# Patient Record
Sex: MALE | Race: WHITE | NOT HISPANIC OR LATINO | URBAN - METROPOLITAN AREA
[De-identification: names, ages, dates, MRNs, and addresses within clinical notes are randomized per-mention and may not be internally consistent; named-entity substitution may affect disease eponyms.]

---
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
## 2019-04-15 ENCOUNTER — OFFICE VISIT - RIVER FALLS (OUTPATIENT)
Dept: FAMILY MEDICINE | Facility: CLINIC | Age: 19
End: 2019-04-15

## 2022-02-12 VITALS
HEART RATE: 84 BPM | SYSTOLIC BLOOD PRESSURE: 128 MMHG | TEMPERATURE: 98.3 F | WEIGHT: 134 LBS | BODY MASS INDEX: 23.74 KG/M2 | DIASTOLIC BLOOD PRESSURE: 84 MMHG

## 2022-02-12 VITALS
WEIGHT: 143 LBS | HEIGHT: 63 IN | OXYGEN SATURATION: 97 % | SYSTOLIC BLOOD PRESSURE: 144 MMHG | BODY MASS INDEX: 25.34 KG/M2 | TEMPERATURE: 97.8 F | HEART RATE: 73 BPM | DIASTOLIC BLOOD PRESSURE: 79 MMHG

## 2022-02-12 VITALS
SYSTOLIC BLOOD PRESSURE: 118 MMHG | WEIGHT: 134.2 LBS | BODY MASS INDEX: 24.06 KG/M2 | WEIGHT: 135.8 LBS | SYSTOLIC BLOOD PRESSURE: 139 MMHG | DIASTOLIC BLOOD PRESSURE: 80 MMHG | HEIGHT: 63 IN | BODY MASS INDEX: 23.77 KG/M2 | HEART RATE: 80 BPM | TEMPERATURE: 99.2 F | TEMPERATURE: 97.8 F | DIASTOLIC BLOOD PRESSURE: 88 MMHG

## 2022-02-12 VITALS
TEMPERATURE: 98.5 F | SYSTOLIC BLOOD PRESSURE: 112 MMHG | BODY MASS INDEX: 24.02 KG/M2 | OXYGEN SATURATION: 97 % | BODY MASS INDEX: 24.03 KG/M2 | HEIGHT: 63 IN | DIASTOLIC BLOOD PRESSURE: 82 MMHG | HEART RATE: 70 BPM | TEMPERATURE: 98.1 F | WEIGHT: 135.6 LBS | WEIGHT: 135.6 LBS

## 2022-02-16 NOTE — PROGRESS NOTES
Patient:   SIMON GEIGER            MRN: [id number]            FIN: [id number]               Age:   16 years     Sex:  Male     :  2000   Associated Diagnoses:   Laceration of left thumb   Author:   Sejal Ty      Chief Complaint       2017 11:34 AM CDT f/u laceration   2017 7:14 PM CDT Reached in a box and cut left thumb on utility knife         History of Present Illness   PPC with grandmother for f/u to left thumb laceration from  repaired with dermabond last night, today has small amt of pink fluid from center and he thought it may be infected      Review of Systems   Constitutional:  Negative.    Integumentary:  Negative except as documented in history of present illness.       Health Status   Allergies:    Allergic Reactions (Selected)  No known allergies   Medications:  (Selected)   Documented Medications  Documented  Advair Diskus 100 mcg-50 mcg inhalation powder: 1 puff(s), inh, bid, 0 Refill(s), Type: Maintenance  Pulmicort Flexhaler 90 mcg/inh inhalation powder: inh, bid, 0 Refill(s), Type: Maintenance   Problem list:    All Problems (Selected)  Absent Kidney, Congenital / ICD-9-.0 / Confirmed  Asthma / SNOMED CT 545658130 / Confirmed  Scoliosis / ICD-9-.30 / Confirmed      Histories   Past Medical History:    Resolved  Asthma ([id number]):  Resolved.   Family History:    No family history items have been selected or recorded.      Physical Examination   Vital Signs   2017 11:34 AM CDT Temperature Temporal 97.8 DegF    Peripheral Pulse Rate 80 bpm    Pulse Site Radial artery    HR Method Manual    Systolic Blood Pressure 118 mmHg    Diastolic Blood Pressure 80 mmHg    Mean Arterial Pressure 93 mmHg    BP Site Right arm    BP Method Manual   2017 7:14 PM CDT Temperature Tympanic 99.2 DegF    Pulse Site Brachial artery    HR Method Electronic    Systolic Blood Pressure 139 mmHg  HI    Diastolic Blood Pressure 88 mmHg  HI    Mean Arterial Pressure 105  mmHg    BP Site Right arm    BP Method Electronic      General:  Alert and oriented, No acute distress.    Eye:  Pupils are equal, round and reactive to light.    HENT:  Normocephalic.    Respiratory:  Respirations are non-labored.    Cardiovascular:  Regular rhythm, No edema.    Musculoskeletal:  Normal range of motion, Normal gait.    Integumentary:  Warm, Dry, Pink, left thumb with superficial laceration, approx 1.5cm-2.0cm long  no induration, no erythema, no excessive warmth  there is a pinpoint opening in center and with pressure a scant amt of serosanguinous drainage is expressed.    Neurologic:  Alert, Oriented, Normal sensory, No focal deficits.    Psychiatric:  Cooperative, Appropriate mood & affect, Normal judgment.       Impression and Plan   Diagnosis     Laceration of left thumb ([id number]-NK S61.012A).     Patient Instructions:       Counseled: Patient, Family, Verbalized understanding.    Summary:  no infection present  bacitracin and bandaide applied, also will use short digit palstic splint to protect finger pad from re-opening, can remove for showering  should be worn throughout the weekend to help with protection  if finger becomes red, there is thick purulent discharge or there is swelling I would like to hear from pt.

## 2022-02-16 NOTE — PROGRESS NOTES
Chief Complaint    Patient here for upper neck pain. Pt. stated that he noticed it about a week ago.  History of Present Illness      Butch is a 17 year old male, with hx of congenital scoliosis and T2-L4 fusion, who presents to the clinic with concerns re:neck pain x 1 week.  he states it feels tight in the posterior neck and upper shoulders.  he has pain with AROM of the neck.  He has never had this problem in the past  he is able to take tylenol with some relieve.  (unable to take NSAIDS due to congenital solitary kidney).  He has had no uri sx, fevers.  he does have HA that feel tension like at times but are mild.  He denies any tingling, numbness, weakness or shooting pains down the arms B.  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          Review of Systems      Review of systems is negative with the exception of those noted in HPI          Physical Exam   Vitals & Measurements    T: 97.8(Tympanic)  HR: 73(Peripheral)  BP: 144/79  SpO2: 97%     HT: 63 in  WT: 143.0 lb  BMI: 25.33       Exam reveals obvious asyemtry to the trunk with shift to the R and R shoulder height higher than L.       loss of normal cervical lordosis wiht forward head and slight kyphosis of the thoracic spine.       There is pain to palpation of the upper traps and nuchal area but no tenderness along the midline C or T spine.       there is decreased AROM in flexion, extension lateral rotation to the R and lateral flexion to the R secondary to both pain as well as asymetry.       muscular strength , sensation and DTR are symetrical and WNL for upper ext B  Assessment/Plan       Neck pain         Discussed with pt conservative measures including PT for maintainence of ROM, Stretching, and pain control.  Pt is agreeable  He may not take nsaids due to solidary kidney, thus I have recommended trial of muscle relaxant and continue  tylenol.  ice, heat may be helpful as well.  if sx not improving or developing radiculopathy may need further evaluation or consideration of fu with his spine surgeon at [facility].   Pt agreeable with plan.         Ordered:          79620 office outpatient visit 15 minutes (Charge), Quantity: 1, Neck pain          Physical Therapy (Request), Priority: Urgent, Instructions: eval and treat , pt prefers home exercise education, Neck pain                Orders:         methocarbamol, 1-2 tabs, PO, tid, PRN: muscle spasm, # 30 tab(s), 0 Refill(s), Type: Maintenance, Pharmacy: [id number], 1-2 tabs po tid,PRN:muscle spasm  Patient Information     Name:SIMON GEIGER      Address:      [address] [place] [id number]     Sex:Male     YOB: 2000     [phone]     MRN:[id number]     FIN:[id number]     Location:[facility]     Date of Service:02/05/2018      Primary Care Physician:       NONE ,   Problem List/Past Medical History    Ongoing     Absent Kidney, Congenital     Asthma     Scoliosis    Historical     Asthma  Procedure/Surgical History     Spinal fusion (07/30/2013)     Tonsillectomy (2003)     Adenoidectomy  Medications     Pulmicort Flexhaler 90 mcg/inh inhalation powder: inh, bid.     Advair Diskus 100 mcg-50 mcg inhalation powder: 1 puff(s), inh, bid.     Robaxin 500 mg oral tablet: 1-2 tabs, PO, tid, PRN: muscle spasm, 30 tab(s), 0 Refill(s).      Allergies    No known allergies  Social History    Smoking Status - 02/05/2018     Never smoker     Tobacco - 07/02/2016      Never smoker  Immunizations      Vaccine Date Status Comments      tetanus/diphth/pertuss (Tdap) adult/adol 07/20/2017 Given      influenza virus vaccine, inactivated 10/30/2015 Given      influenza virus vaccine, inactivated 10/03/2014 Given      influenza virus vaccine, inactivated 11/05/2013 Recorded [1/14/2014] This injection was given at [facility].      influenza virus vaccine, inactivated 09/20/2012 Recorded      meningococcal conjugate vaccine 02/04/2011 Recorded      tetanus/diphth/pertuss (Tdap) adult/adol 02/04/2011 Recorded  Lab Results   Results (Last 90 days)   No results located.

## 2022-02-16 NOTE — PROGRESS NOTES
Patient:   SIMON GEIGER            MRN: [id number]            FIN: [id number]               Age:   16 years     Sex:  Male     :  2000   Associated Diagnoses:   Laceration of left thumb   Author:   Edvin Eagle MD      Chief Complaint  Reached in a box and cut left thumb on utility knife (17 19:14 by Teressa Ray CMA).      Physical Examination   General:  Vital Signs   2017 7:14 PM CDT Temperature Tympanic 99.2 DegF    Pulse Site Brachial artery    HR Method Electronic    Systolic Blood Pressure 139 mmHg  HI    Diastolic Blood Pressure 88 mmHg  HI    Mean Arterial Pressure 105 mmHg    BP Site Right arm    BP Method Electronic      .       Procedure   Laceration repair procedure   Date:  2017.     Location: the left thumb, 1.5 cm, superficial.     Preparation and technique: skin prepped in usual sterile fashion, sterile preparation of site in usual fashion, skin closure completed with tissue adhesive.     Procedure tolerated: well.     No Complications.        Impression and Plan   Diagnosis     Laceration of left thumb ([id number] S61.012A).     Counseled:  discussed wound care, expected course of healing.

## 2022-02-16 NOTE — NURSING NOTE
Comprehensive Intake Entered On:  4/15/2019 2:01 PM CDT    Performed On:  4/15/2019 1:55 PM CDT by KristiYenny medina               Summary   Weight Measured :   134 lb(Converted to: 134 lb 0 oz, 60.78 kg)    Yenny Hanna - 4/15/2019 2:03 PM CDT   Chief Complaint :   Complains of score throat x3 days.   Systolic Blood Pressure :   128 mmHg   Diastolic Blood Pressure :   84 mmHg (HI)    Mean Arterial Pressure :   99 mmHg   Peripheral Pulse Rate :   84 bpm   BP Site :   Right arm   Pulse Site :   Radial artery   BP Method :   Manual   HR Method :   Manual   Temperature Tympanic :   98.3 DegF(Converted to: 36.8 DegC)    Yenny Hanna - 4/15/2019 1:55 PM CDT   Health Status   Allergies Verified? :   Yes   Medication History Verified? :   Yes   Pre-Visit Planning Status :   Not completed   Tobacco Use? :   Never smoker   Yenny Hanna - 4/15/2019 1:55 PM CDT   Demographics   Last Name :   Sudheer   Address :   [facility] [address] [place] [facility] [address]   First Name :   Butch   Panda Initial :   E   Responsible Party Date of Birth () :   2000 CDT   City :   [place]   State :   [place]   Zip Code :   [address]   Yenny Hanna - 4/15/2019 1:55 PM CDT   Consents   Consent for Immunization Exchange :   Consent Granted   Consent for Immunizations to Providers :   Consent Granted   Yenny Hanna - 4/15/2019 1:55 PM CDT   Problems   (As Of: 4/15/2019 2:01:01 PM CDT)   Problems(Active)    Absent Kidney, Congenital (ICD-9-CM  :753.0 )  Name of Problem:   Absent Kidney, Congenital ; Recorder:   Tommy Michaels MD; Confirmation:   Confirmed ; Classification:   Medical ; Code:   753.0 ; Contributor System:   PowerChart ; Last Updated:   2014 7:16 PM CST ; Life Cycle Status:   Active ; Responsible Provider:   Tommy Michaels MD; Vocabulary:   ICD-9-CM        Asthma (SNOMED CT  :663402430 )  Name of Problem:   Asthma ; Recorder:   Tommy Michaels MD; Confirmation:   Confirmed ; Classification:   Medical ; Code:    730214450 ; Contributor System:   [employer] ; Last Updated:   10/13/2015 4:28 PM CDT ; Life Cycle Status:   Active ; Responsible Provider:   Tommy Michaels MD; Vocabulary:   SNOMED CT        Scoliosis (ICD-9-CM  :737.30 )  Name of Problem:   Scoliosis ; Recorder:   Tommy Michaels MD; Confirmation:   Confirmed ; Classification:   Medical ; Code:   737.30 ; Contributor System:   [employer] ; Last Updated:   2/28/2014 7:16 PM CST ; Life Cycle Status:   Active ; Responsible Provider:   Tommy Michaels MD; Vocabulary:   ICD-9-CM          Meds / Allergies   (As Of: 4/15/2019 2:01:01 PM CDT)   Allergies (Active)   No known allergies  Estimated Onset Date:   Unspecified ; Created By:   Ana Laura Linares CMA; Reaction Status:   Active ; Category:   Drug ; Substance:   No known allergies ; Type:   Allergy ; Updated By:   Ana Laura Linares CMA; Reviewed Date:   4/15/2019 2:00 PM CDT        Medication List   (As Of: 4/15/2019 2:01:01 PM CDT)   Prescription/Discharge Order    ofloxacin otic  :   ofloxacin otic ; Status:   Prescribed ; Ordered As Mnemonic:   ofloxacin 0.3% otic solution ; Simple Display Line:   5 drop(s), Ear-Left, BID, for 7 day(s), 10 mL, 0 Refill(s) ; Ordering Provider:   Edvin Sanchez MD; Catalog Code:   ofloxacin otic ; Order Dt/Tm:   7/16/2018 9:32:48 AM            Home Meds    albuterol  :   albuterol ; Status:   Documented ; Ordered As Mnemonic:   albuterol 90 mcg/inh inhalation aerosol ; Simple Display Line:   2 puff(s), Inhale, q4-6 hrs, PRN: as needed for wheezing, 0 Refill(s) ; Catalog Code:   albuterol ; Order Dt/Tm:   7/20/2018 1:05:15 PM

## 2022-02-16 NOTE — PROGRESS NOTES
Chief Complaint    Complains of score throat x3 days.  History of Present Illness      Patient presents to the clinic with a 3-4-day history of rhinorrhea and nasal congestion scratchy throat and sneezing.  He denies any fevers.  No nausea or vomiting.  No facial pain or tooth pain.  He has had similar symptoms in the past and believes they may be related to allergies.  No wheezing or shortness of breath.  Review of Systems      Review of systems is negative with the exception of those noted in HPI          Physical Exam   Vitals & Measurements    T: 98.3   F (Tympanic)  HR: 84(Peripheral)  BP: 128/84     WT: 134 lb           Vitals as above per nursing documentation           Constitutional : nad appears well          Ears: ears patent B, TMS intact, noninjected           Nose: nasal mucosa is non-ededmatous. no discharge           Throat: pharynx is nonerythematous, no tonsillar hypertrophy, no exudate           Neck: neck supple, no adenopathy, no thyromegaly, no rigidity           Lungs: lungs CTA', no Wheezes, rhonchi or rales           Heart: heart RRR, nl S1, S2 no murmur           skin:  No rashes              Assessment/Plan       1. Environmental allergies (Z91.09)         zyrtec D. push fluids, rest and ibuprofen or tylenol for comfort.  Pt instructed to return to clinic for persistent or worsening symptoms.                  Orders:         cetirizine-pseudoephedrine, 1 tab(s), PO, BID, # 180 tab(s), 3 Refill(s), Type: Maintenance, Pharmacy: [id number], 1 tab(s) Oral bid, (Ordered)  Patient Information     Name:SIMON GEIGER      Address:      [address] [place] [id number]     Sex:Male     YOB: 2000     [phone]     Emergency Contact:[address]     MRN:[id number]     FIN:[id number]     Location:[facility]     Date of Service:04/15/2019      Primary Care Physician:       ANT       Attending Physician:        Pat GAY, Марина BOLES, [phone]  Problem List/Past Medical History    Ongoing     Absent Kidney, Congenital     Asthma     Scoliosis    Historical     Asthma  Procedure/Surgical History     Spinal fusion (07/30/2013)            Comments: T2 to L4.     Tonsillectomy (2003)           Adenoidectomy        Medications     ofloxacin 0.3% otic solution: 5 drop(s), Ear-Left, BID, for 7 day(s), 10 mL, 0 Refill(s).     albuterol 90 mcg/inh inhalation aerosol: 2 puff(s), Inhale, q4-6 hrs, PRN: as needed for wheezing, 0 Refill(s).     ZyrTEC-D 5 mg-120 mg oral tablet, extended release: 1 tab(s), PO, BID, 180 tab(s), 3 Refill(s).          Allergies    No known allergies  Social History    Smoking Status - 04/15/2019     Never smoker     Tobacco      Never smoker, 07/02/2016  Immunizations      Vaccine Date Status Comments      tetanus/diphth/pertuss (Tdap) adult/adol 07/20/2017 Given      influenza virus vaccine, inactivated 10/30/2015 Given      influenza virus vaccine, inactivated 10/03/2014 Given      influenza virus vaccine, inactivated 11/05/2013 Recorded [1/14/2014] This injection was given at [facility].      influenza virus vaccine, inactivated 09/20/2012 Recorded      meningococcal conjugate vaccine 02/04/2011 Recorded      tetanus/diphth/pertuss (Tdap) adult/adol 02/04/2011 Recorded

## 2022-02-16 NOTE — PROGRESS NOTES
Patient:   SIMON GEIGER            MRN: [id number]            FIN: [id number]               Age:   17 years     Sex:  Male     :  2000   Associated Diagnoses:   Tympanic membrane perforation   Author:   Edvin Sanchez MD      Visit Information      Date of Service: 2018 09:09 am  Performing Location: [facility]  Encounter#: [id number]      Primary Care Provider (PCP):  [facility] -UNKNOWN,      Referring Provider:  Edvin Sanchez MD    NPI# [id number]      Chief Complaint   2018 9:16 AM CDT    ear blockage left ear- tubing on saturday fell off and felt like something went into his ear. feels clogged, has been draining, loss of hearing        History of Present Illness   Tubing behind a boat on Saturday and fell off with feeling of something going in ear. Has had drainage for the past two days. Denies pain. Has hearing loss.      Review of Systems   Constitutional:  No fever.    Gastrointestinal:  No vomiting.       Health Status   Allergies:    Allergic Reactions (Selected)  No known allergies   Medications:  (Selected)   Prescriptions  Prescribed  Robaxin 500 mg oral tablet: 1-2 tabs, PO, tid, PRN: muscle spasm, # 30 tab(s), 0 Refill(s), Type: Maintenance, Pharmacy: [id number], 1-2 tabs po tid,PRN:muscle spasm   Problem list:    All Problems  Absent Kidney, Congenital / ICD-9-.0 / Confirmed  Asthma / SNOMED CT 293065097 / Confirmed  Scoliosis / ICD-9-.30 / Confirmed  Resolved: Asthma / SNOMED CT 250243290      Histories   Procedure history:    Spinal fusion (SNOMED CT 58275773) on 2013 at 12 Years.  Comments:  8/15/2013 5:38 AM - [facility]  T2 to L4  Tonsillectomy (SNOMED CT 123226966) in  at 3 Years.  Adenoidectomy (SNOMED CT 256534579).     Social History: Graduated [employer] and will attend [employer]      Physical Examination   Vital Signs   2018 9:16 AM CDT Temperature Tympanic 98.1 DegF    Peripheral Pulse Rate 70 bpm    HR Method Electronic     Systolic Blood Pressure 112 mmHg    Diastolic Blood Pressure 82 mmHg  HI    Mean Arterial Pressure 92 mmHg    BP Site Right arm    BP Method Manual    Oxygen Saturation 97 %      Measurements from flowsheet : Measurements   7/16/2018 9:16 AM CDT Height/Length Estimated 63 in    Weight Measured - Standard 135.6 lb      General:  Alert and oriented, No acute distress.    HENT:  mild TM perforation left TM with white exudate in the canal.    Neck:  No lymphadenopathy.    Musculoskeletal:  Normal gait.       Impression and Plan   Diagnosis     Tympanic membrane perforation (KNX14-KP H72.90).       TM perforation: given hearing loss refer to ENT. Start ofloxacin for 7 days.